# Patient Record
Sex: FEMALE | Race: WHITE | Employment: FULL TIME | URBAN - METROPOLITAN AREA
[De-identification: names, ages, dates, MRNs, and addresses within clinical notes are randomized per-mention and may not be internally consistent; named-entity substitution may affect disease eponyms.]

---

## 2017-03-13 ENCOUNTER — OFFICE VISIT (OUTPATIENT)
Dept: INTERNAL MEDICINE CLINIC | Facility: CLINIC | Age: 57
End: 2017-03-13

## 2017-03-13 ENCOUNTER — TELEPHONE (OUTPATIENT)
Dept: INTERNAL MEDICINE CLINIC | Facility: CLINIC | Age: 57
End: 2017-03-13

## 2017-03-13 VITALS
SYSTOLIC BLOOD PRESSURE: 140 MMHG | HEART RATE: 93 BPM | WEIGHT: 228 LBS | TEMPERATURE: 98 F | HEIGHT: 66 IN | BODY MASS INDEX: 36.64 KG/M2 | DIASTOLIC BLOOD PRESSURE: 88 MMHG

## 2017-03-13 DIAGNOSIS — Z09 HOSPITAL DISCHARGE FOLLOW-UP: ICD-10-CM

## 2017-03-13 DIAGNOSIS — R05.9 COUGH: Primary | ICD-10-CM

## 2017-03-13 DIAGNOSIS — Z12.11 COLON CANCER SCREENING: ICD-10-CM

## 2017-03-13 DIAGNOSIS — Z09 FOLLOW-UP EXAM, 3-6 MONTHS SINCE PREVIOUS EXAM: ICD-10-CM

## 2017-03-13 LAB
FLUAV + FLUBV RNA SPEC NAA+PROBE: NEGATIVE
FLUAV + FLUBV RNA SPEC NAA+PROBE: NEGATIVE
FLUAV + FLUBV RNA SPEC NAA+PROBE: POSITIVE

## 2017-03-13 PROCEDURE — 99212 OFFICE O/P EST SF 10 MIN: CPT | Performed by: INTERNAL MEDICINE

## 2017-03-13 PROCEDURE — 99213 OFFICE O/P EST LOW 20 MIN: CPT | Performed by: INTERNAL MEDICINE

## 2017-03-13 RX ORDER — AMOXICILLIN AND CLAVULANATE POTASSIUM 500; 125 MG/1; MG/1
1 TABLET, FILM COATED ORAL
Qty: 20 TABLET | Refills: 0 | Status: SHIPPED | OUTPATIENT
Start: 2017-03-13 | End: 2018-01-05 | Stop reason: ALTCHOICE

## 2017-03-13 NOTE — PROGRESS NOTES
HPI:    Patient ID: Kurtis Roa is a 49year old female. Cough  This is a new problem. The current episode started in the past 7 days. The problem has been gradually worsening. The problem occurs constantly.  The cough is productive of brown sputum and breath sounds normal. No respiratory distress. She has no wheezes. She has no rales. Abdominal: She exhibits no distension. There is no tenderness. Lymphadenopathy:     She has no cervical adenopathy. Skin: She is diaphoretic.    Nursing note and

## 2017-03-13 NOTE — TELEPHONE ENCOUNTER
Pt stts she  Was seen today and forget to order Rx Flonase and Rx Ventolin HFA. Please advise         Current outpatient prescriptions:     •  Fluticasone Propionate (FLONASE) 50 MCG/ACT Nasal Suspension, 1 spray by Each Nare route daily. , Disp: 1 Bottle,

## 2017-03-14 ENCOUNTER — TELEPHONE (OUTPATIENT)
Dept: INTERNAL MEDICINE CLINIC | Facility: CLINIC | Age: 57
End: 2017-03-14

## 2017-03-14 RX ORDER — OSELTAMIVIR PHOSPHATE 75 MG/1
75 CAPSULE ORAL 2 TIMES DAILY
Qty: 10 CAPSULE | Refills: 0 | Status: SHIPPED | OUTPATIENT
Start: 2017-03-14 | End: 2017-07-12

## 2017-03-14 NOTE — TELEPHONE ENCOUNTER
Positive lab result  Influenza A by PCR Negative  Negative     Influenza B by PCR Negative  Positive (A)

## 2017-03-18 RX ORDER — FLUTICASONE PROPIONATE 50 MCG
1 SPRAY, SUSPENSION (ML) NASAL DAILY
Qty: 1 BOTTLE | Refills: 7 | Status: SHIPPED | OUTPATIENT
Start: 2017-03-18 | End: 2017-05-22

## 2017-03-18 RX ORDER — ALBUTEROL SULFATE 90 UG/1
2 AEROSOL, METERED RESPIRATORY (INHALATION) EVERY 4 HOURS PRN
Qty: 1 INHALER | Refills: 5 | Status: SHIPPED | OUTPATIENT
Start: 2017-03-18

## 2017-03-20 RX ORDER — FLUTICASONE PROPIONATE 50 MCG
SPRAY, SUSPENSION (ML) NASAL
Qty: 1 BOTTLE | Refills: 11 | Status: SHIPPED | OUTPATIENT
Start: 2017-03-20 | End: 2017-07-12

## 2017-03-27 ENCOUNTER — TELEPHONE (OUTPATIENT)
Dept: INTERNAL MEDICINE CLINIC | Facility: CLINIC | Age: 57
End: 2017-03-27

## 2017-03-27 NOTE — TELEPHONE ENCOUNTER
Per pt, she would like her Order dated 03/13/2017 to send thru mail address on file verified due to pt is going to a different lab,  Per pt, stts that her Order should put US as needed. Pls advise.

## 2017-05-24 RX ORDER — FLUTICASONE PROPIONATE 50 MCG
SPRAY, SUSPENSION (ML) NASAL
Qty: 1 BOTTLE | Refills: 11 | Status: SHIPPED | OUTPATIENT
Start: 2017-05-24 | End: 2020-11-11

## 2017-06-14 ENCOUNTER — TELEPHONE (OUTPATIENT)
Dept: INTERNAL MEDICINE CLINIC | Facility: CLINIC | Age: 57
End: 2017-06-14

## 2017-07-12 ENCOUNTER — OFFICE VISIT (OUTPATIENT)
Dept: GASTROENTEROLOGY | Facility: CLINIC | Age: 57
End: 2017-07-12

## 2017-07-12 VITALS
DIASTOLIC BLOOD PRESSURE: 82 MMHG | HEIGHT: 66 IN | BODY MASS INDEX: 39.02 KG/M2 | WEIGHT: 242.81 LBS | SYSTOLIC BLOOD PRESSURE: 138 MMHG | HEART RATE: 84 BPM

## 2017-07-12 DIAGNOSIS — Z12.11 ENCOUNTER FOR SCREENING COLONOSCOPY: Primary | ICD-10-CM

## 2017-07-12 PROCEDURE — 99212 OFFICE O/P EST SF 10 MIN: CPT | Performed by: INTERNAL MEDICINE

## 2017-07-12 PROCEDURE — 99241 OFFICE CONSULTATION,LEVEL I: CPT | Performed by: INTERNAL MEDICINE

## 2017-07-12 NOTE — PATIENT INSTRUCTIONS
Schedule colonoscopy exam at [facility] ([facility] [address]    IV sedation (conscious sedation)    Suprep small volume bowel prep if covered by insurance, otherwise Golytely (PEG) 4L bowel prep      DX = Screening CLN

## 2017-07-12 NOTE — PROGRESS NOTES
HPI:    Patient ID: Irene Renee is a 49year old woman with history of allergies who presents to discuss her first screening colonoscopy examination. On review of systems, MsLinda  Kimmy Giraldo describes a distant history of a severe acute diarrhea syndrom with possible biopsy, possible polypectomy. We discussed sedation options of conscious sedation versus MAC anesthesia, and agreed on conscious sedation.  We discussed the nature and risks of colonoscopy examination including sedation, anesthesia risks; blee

## 2017-07-14 ENCOUNTER — TELEPHONE (OUTPATIENT)
Dept: GASTROENTEROLOGY | Facility: CLINIC | Age: 57
End: 2017-07-14

## 2017-07-14 NOTE — TELEPHONE ENCOUNTER
Scheduled for:  Colonoscopy [address]  Provider Name:  Macey Zimmer  Date:  9/18/17  Location:  [facility]  Sedation:  Iv Sedation  Time:  From-1315 pm changed to 1415 /arrival from- 1215 pm changed to 1315 pm  Prep:Suprep or Golytely  Meds/Allergies Reconciled?:  Physi

## 2017-07-21 NOTE — TELEPHONE ENCOUNTER
Called pt to let her know that her procedure time was changed to 2:15 pm and to arrive at 1315 at [facility] on Sept .18. See below. Sydnee Zavala was Aware of these changes

## 2017-09-18 ENCOUNTER — HOSPITAL ENCOUNTER (OUTPATIENT)
Facility: HOSPITAL | Age: 57
Setting detail: HOSPITAL OUTPATIENT SURGERY
Discharge: HOME | End: 2017-09-18
Attending: INTERNAL MEDICINE | Admitting: INTERNAL MEDICINE
Payer: COMMERCIAL

## 2017-09-18 ENCOUNTER — SURGERY (OUTPATIENT)
Age: 57
End: 2017-09-18

## 2017-09-18 VITALS
HEART RATE: 63 BPM | WEIGHT: 230 LBS | DIASTOLIC BLOOD PRESSURE: 87 MMHG | HEIGHT: 66 IN | SYSTOLIC BLOOD PRESSURE: 134 MMHG | BODY MASS INDEX: 36.96 KG/M2 | OXYGEN SATURATION: 96 % | RESPIRATION RATE: 15 BRPM

## 2017-09-18 PROCEDURE — 45378 DIAGNOSTIC COLONOSCOPY: CPT | Performed by: INTERNAL MEDICINE

## 2017-09-18 PROCEDURE — 0DJD8ZZ INSPECTION OF LOWER INTESTINAL TRACT, VIA NATURAL OR ARTIFICIAL OPENING ENDOSCOPIC: ICD-10-PCS | Performed by: INTERNAL MEDICINE

## 2017-09-18 PROCEDURE — G0500 MOD SEDAT ENDO SERVICE >5YRS: HCPCS | Performed by: INTERNAL MEDICINE

## 2017-09-18 PROCEDURE — 99153 MOD SED SAME PHYS/QHP EA: CPT | Performed by: INTERNAL MEDICINE

## 2017-09-18 RX ORDER — SODIUM CHLORIDE 0.9 % (FLUSH) 0.9 %
10 SYRINGE (ML) INJECTION AS NEEDED
Status: DISCONTINUED | OUTPATIENT
Start: 2017-09-18 | End: 2017-09-18

## 2017-09-18 RX ORDER — MIDAZOLAM HYDROCHLORIDE 1 MG/ML
1 INJECTION INTRAMUSCULAR; INTRAVENOUS EVERY 5 MIN PRN
Status: DISCONTINUED | OUTPATIENT
Start: 2017-09-18 | End: 2017-09-18

## 2017-09-18 RX ORDER — SODIUM CHLORIDE, SODIUM LACTATE, POTASSIUM CHLORIDE, CALCIUM CHLORIDE 600; 310; 30; 20 MG/100ML; MG/100ML; MG/100ML; MG/100ML
INJECTION, SOLUTION INTRAVENOUS CONTINUOUS
Status: DISCONTINUED | OUTPATIENT
Start: 2017-09-18 | End: 2017-09-18

## 2017-09-18 RX ORDER — MIDAZOLAM HYDROCHLORIDE 1 MG/ML
INJECTION INTRAMUSCULAR; INTRAVENOUS
Status: DISCONTINUED | OUTPATIENT
Start: 2017-09-18 | End: 2017-09-18

## 2017-09-18 NOTE — H&P
History & Physical Examination    Patient Name: Obdulia Worrell  MRN: [id number]  CSN: [id number]  YOB: 1960    Diagnosis: Screening colonoscopy examination    Present Illness:     Obdulia Worrell is a 49year old woman with history of al unspecified    • Seasonal allergies      Past Surgical History:  No date: ORAL SURGERY  2012: OTHER SURGICAL HISTORY Bilateral      Comment: lasik  Family History   Problem Relation Age of Onset   • Cancer Mother 76     Lung   • Heart Disorder Maternal Gra

## 2017-09-18 NOTE — OPERATIVE REPORT
COLONOSCOPY PROCEDURE REPORT     DATE OF PROCEDURE:  9/18/2017     PCP: No primary care provider on file. PREOPERATIVE DIAGNOSIS:  Screening colonoscopy examination     POSTOPERATIVE DIAGNOSIS:  See impression. SURGEON:  Scooter Crump M.D.

## 2018-01-05 ENCOUNTER — OFFICE VISIT (OUTPATIENT)
Dept: SURGERY | Facility: CLINIC | Age: 58
End: 2018-01-05

## 2018-01-05 VITALS — HEIGHT: 66 IN | WEIGHT: 239.81 LBS | BODY MASS INDEX: 38.54 KG/M2

## 2018-01-05 DIAGNOSIS — N62 MACROMASTIA: Primary | ICD-10-CM

## 2018-01-05 PROCEDURE — 99203 OFFICE O/P NEW LOW 30 MIN: CPT | Performed by: SURGERY

## 2018-01-05 NOTE — CONSULTS
New Patient Consultation    Chief Complaint: macromastia, shoulder grooving, upper back pain, intertrigo    History of Present Illness:   Naomi Wong is a 56year old female who reports having had large breast ever since high school.   She initially w reports see HPI  General:   The patient denies, fever, chills, night sweats, fatigue, generalized weakness, change in appetite, weight loss, or weight gain. Endocrine:    There is no history of poor/slow wound healing, weight loss/gain, fertility or hormon stiff joints, neck pain, back pain or bone pain.   Neurologic:  There is no history of migraines or severe headaches, seizure/epilepsy, speech problems, coordination problems, trembling/tremors, fainting/black outs, dizziness, memory problems, loss of sensa her back for preoperative visit. In case of insurance denial will also provide her with a quote for the procedure. Her mammograms are done yearly up-to-date and normal.  She has no family history of breast cancer.     The procedures and the postoperative

## 2018-04-06 ENCOUNTER — TELEPHONE (OUTPATIENT)
Dept: SURGERY | Facility: CLINIC | Age: 58
End: 2018-04-06

## 2018-04-06 NOTE — TELEPHONE ENCOUNTER
Spoke to regarding her Pre-D request for surgery. Informed patient of the denial.  Patient is considering her next steps and call back at a later time.

## 2023-03-22 ENCOUNTER — OFFICE VISIT (OUTPATIENT)
Dept: OTHER | Facility: HOSPITAL | Age: 63
End: 2023-03-22
Attending: FAMILY MEDICINE

## 2023-03-22 ENCOUNTER — HOSPITAL ENCOUNTER (OUTPATIENT)
Dept: GENERAL RADIOLOGY | Facility: HOSPITAL | Age: 63
Discharge: HOME | End: 2023-03-22
Attending: FAMILY MEDICINE

## 2023-03-22 DIAGNOSIS — M54.50 ACUTE BILATERAL LOW BACK PAIN: ICD-10-CM

## 2023-03-22 DIAGNOSIS — M54.50 ACUTE BILATERAL LOW BACK PAIN: Primary | ICD-10-CM

## 2023-03-22 PROCEDURE — 72110 X-RAY EXAM L-2 SPINE 4/>VWS: CPT | Performed by: FAMILY MEDICINE

## 2023-03-22 RX ORDER — NAPROXEN 500 MG/1
500 TABLET ORAL 2 TIMES DAILY WITH MEALS
Qty: 30 TABLET | Refills: 1 | Status: SHIPPED | OUTPATIENT
Start: 2023-03-22 | End: 2023-04-21

## 2023-03-22 RX ORDER — CYCLOBENZAPRINE HCL 10 MG
10 TABLET ORAL NIGHTLY PRN
Qty: 30 TABLET | Refills: 0 | Status: SHIPPED | OUTPATIENT
Start: 2023-03-22 | End: 2023-04-21

## 2023-03-28 ENCOUNTER — APPOINTMENT (OUTPATIENT)
Dept: OTHER | Facility: HOSPITAL | Age: 63
End: 2023-03-28
Attending: FAMILY MEDICINE

## 2023-05-19 ENCOUNTER — LAB ENCOUNTER (OUTPATIENT)
Dept: LAB | Facility: HOSPITAL | Age: 63
End: 2023-05-19
Attending: REGISTERED NURSE
Payer: COMMERCIAL

## 2023-05-19 ENCOUNTER — OFFICE VISIT (OUTPATIENT)
Facility: CLINIC | Age: 63
End: 2023-05-19

## 2023-05-19 VITALS
DIASTOLIC BLOOD PRESSURE: 95 MMHG | BODY MASS INDEX: 39.7 KG/M2 | HEIGHT: 66 IN | HEART RATE: 90 BPM | SYSTOLIC BLOOD PRESSURE: 155 MMHG | WEIGHT: 247 LBS

## 2023-05-19 DIAGNOSIS — K59.00 CONSTIPATION, UNSPECIFIED CONSTIPATION TYPE: Primary | ICD-10-CM

## 2023-05-19 DIAGNOSIS — R19.5 DARK STOOLS: ICD-10-CM

## 2023-05-19 LAB
BASOPHILS # BLD AUTO: 0.03 X10(3) UL (ref 0–0.2)
BASOPHILS NFR BLD AUTO: 0.6 %
DEPRECATED RDW RBC AUTO: 48.7 FL (ref 35.1–46.3)
EOSINOPHIL # BLD AUTO: 0.17 X10(3) UL (ref 0–0.7)
EOSINOPHIL NFR BLD AUTO: 3.2 %
ERYTHROCYTE [DISTWIDTH] IN BLOOD BY AUTOMATED COUNT: 14.3 % (ref 11–15)
HCT VFR BLD AUTO: 33.7 %
HGB BLD-MCNC: 10.3 G/DL
IMM GRANULOCYTES # BLD AUTO: 0.01 X10(3) UL (ref 0–1)
IMM GRANULOCYTES NFR BLD: 0.2 %
LYMPHOCYTES # BLD AUTO: 1.52 X10(3) UL (ref 1–4)
LYMPHOCYTES NFR BLD AUTO: 28.8 %
MCH RBC QN AUTO: 28.1 PG (ref 26–34)
MCHC RBC AUTO-ENTMCNC: 30.6 G/DL (ref 31–37)
MCV RBC AUTO: 91.8 FL
MONOCYTES # BLD AUTO: 0.42 X10(3) UL (ref 0.1–1)
MONOCYTES NFR BLD AUTO: 8 %
NEUTROPHILS # BLD AUTO: 3.12 X10 (3) UL (ref 1.5–7.7)
NEUTROPHILS # BLD AUTO: 3.12 X10(3) UL (ref 1.5–7.7)
NEUTROPHILS NFR BLD AUTO: 59.2 %
PLATELET # BLD AUTO: 320 10(3)UL (ref 150–450)
RBC # BLD AUTO: 3.67 X10(6)UL
WBC # BLD AUTO: 5.3 X10(3) UL (ref 4–11)

## 2023-05-19 PROCEDURE — 85025 COMPLETE CBC W/AUTO DIFF WBC: CPT | Performed by: REGISTERED NURSE

## 2023-05-19 PROCEDURE — 36415 COLL VENOUS BLD VENIPUNCTURE: CPT | Performed by: REGISTERED NURSE

## 2023-05-19 RX ORDER — PANTOPRAZOLE SODIUM 40 MG/1
40 TABLET, DELAYED RELEASE ORAL
Qty: 60 TABLET | Refills: 0 | Status: SHIPPED | OUTPATIENT
Start: 2023-05-19 | End: 2023-07-18

## 2023-05-19 NOTE — PATIENT INSTRUCTIONS
-Complete CBC  -Empiric PPI  -Start Miralax 17g per day, titrate up or down for ideal frequency  -Increase water intake to 64oz of water per day  -Increase fiber to 20-30 g per day with fruits, vegetables and whole grains, citrucel or fibercon fiber supplement  -Increase exercise to 150 mins/week  -Do not ignore urge to defecate  -Avoid artifical sugars

## 2023-05-22 ENCOUNTER — TELEPHONE (OUTPATIENT)
Facility: CLINIC | Age: 63
End: 2023-05-22

## 2023-05-22 DIAGNOSIS — R19.5 DARK STOOLS: ICD-10-CM

## 2023-05-22 DIAGNOSIS — D50.8 OTHER IRON DEFICIENCY ANEMIA: Primary | ICD-10-CM

## 2023-05-22 DIAGNOSIS — K59.00 CONSTIPATION, UNSPECIFIED CONSTIPATION TYPE: ICD-10-CM

## 2023-05-22 NOTE — TELEPHONE ENCOUNTER
Schedulers:  Please contact patient to schedule EGD per below orders from Doris FUENTES.     Thank you

## 2023-05-22 NOTE — TELEPHONE ENCOUNTER
Pt called via listed phone number,  verified. Discussed CBC with noted normocytic anemia. Recommend completing iron studies to rule out iron deficiency anemia as well as TSH/calcium for constipation. Discussed role of bidirectional endoscopic evaluation given anemia, intermittent constipation and dark stools. Pt does not wish to complete colonoscopy at this time but is amenable to proceeding with EGD to further evaluate of possible esophagitis, gastritis, PUD, AVM, or neoplasm. Discussed risk of delayed/missed diagnosis if she does not undergo colonoscopy. She states understanding and still wishes to only undergo EGD at this time. Pt will complete lab work and schedule EGD. Will discuss adding colonoscopy following iron study results. Continue PPI and bowel regimen in the interim. EGD consent: I have discussed the risks, benefits, and alternatives to upper endoscopy/enteroscopy with the patient/primary decision maker [who demonstrated understanding], including but not limited to the risks of bleeding, infection, pain, death, as well as the risks of anesthesia and perforation all leading to prolonged hospitalization, surgical intervention, or even death. I also specifically mentioned the miss rate of upper endoscopy of 5-10% in the best of all circumstances. The patient has agreed to sign an informed consent and elected to proceed with procedure with possible intervention [i.e. polypectomy, stent placement, etc.] as indicated. GI RNs,    Please, schedule Ms. Quinton Santiago for EGD with MAC for anemia and dark stools.     Thanks,  Odalis Rey

## 2023-06-06 ENCOUNTER — TELEPHONE (OUTPATIENT)
Facility: CLINIC | Age: 63
End: 2023-06-06

## 2023-06-06 NOTE — TELEPHONE ENCOUNTER
Scheduled for:  EGD 490[address]  Provider Name:  Dr Wicho Kumari  Date:  7/19/2023  Location:  [facility]  Sedation:  MAC  Time:  10:45 am. (pt is aware to arrive at 9:45 am)  Prep:  NPO after midnight  Meds/Allergies Reconciled?:  Yes  Diagnosis with codes:  Anemia D50.8, dark stools R19.5  Was patient informed to call insurance with codes (Y/N):  Yes   Referral sent?:  Referral was sent at the time of electronic surgical scheduling. [facility] or [facility] [address] [place] [address] notified?:  I sent an electronic request to Endo Scheduling and received a confirmation today. Medication Orders:  Pt is aware to NOT take iron pills, herbal meds and diet supplements for 7 days before exam. Also to NOT take any form of alcohol, recreational drugs and any forms of ED meds 24 hours before exam.   Misc Orders:  N/A   Further instructions given by staff:  I discussed the prep instructions with the patient which she verbally understood. I will send prep instructions to My Chart.

## 2023-06-06 NOTE — TELEPHONE ENCOUNTER
Patient contacted her insurance and was informed that our office needs to call for prior authorization, phone [phone],GUNJANLZLETITIA.   *Please update patient at [phone]

## 2023-07-19 ENCOUNTER — HOSPITAL ENCOUNTER (OUTPATIENT)
Facility: HOSPITAL | Age: 63
Setting detail: HOSPITAL OUTPATIENT SURGERY
Discharge: HOME | End: 2023-07-19
Attending: INTERNAL MEDICINE | Admitting: INTERNAL MEDICINE
Payer: COMMERCIAL

## 2023-07-19 ENCOUNTER — TELEPHONE (OUTPATIENT)
Dept: GASTROENTEROLOGY | Facility: CLINIC | Age: 63
End: 2023-07-19

## 2023-07-19 VITALS
TEMPERATURE: 98 F | HEIGHT: 66 IN | WEIGHT: 240 LBS | HEART RATE: 73 BPM | SYSTOLIC BLOOD PRESSURE: 126 MMHG | OXYGEN SATURATION: 92 % | DIASTOLIC BLOOD PRESSURE: 75 MMHG | BODY MASS INDEX: 38.57 KG/M2 | RESPIRATION RATE: 17 BRPM

## 2023-07-19 DIAGNOSIS — R19.5 DARK STOOLS: ICD-10-CM

## 2023-07-19 DIAGNOSIS — D50.8 OTHER IRON DEFICIENCY ANEMIA: ICD-10-CM

## 2023-07-19 PROBLEM — D64.9 ABSOLUTE ANEMIA: Status: ACTIVE | Noted: 2023-07-19

## 2023-07-19 PROBLEM — K31.9 GASTRIC ERYTHEMA: Status: ACTIVE | Noted: 2023-07-19

## 2023-07-19 PROCEDURE — 0DB98ZX EXCISION OF DUODENUM, VIA NATURAL OR ARTIFICIAL OPENING ENDOSCOPIC, DIAGNOSTIC: ICD-10-PCS | Performed by: INTERNAL MEDICINE

## 2023-07-19 PROCEDURE — G0500 MOD SEDAT ENDO SERVICE >5YRS: HCPCS | Performed by: INTERNAL MEDICINE

## 2023-07-19 PROCEDURE — 0DB78ZX EXCISION OF STOMACH, PYLORUS, VIA NATURAL OR ARTIFICIAL OPENING ENDOSCOPIC, DIAGNOSTIC: ICD-10-PCS | Performed by: INTERNAL MEDICINE

## 2023-07-19 PROCEDURE — 43239 EGD BIOPSY SINGLE/MULTIPLE: CPT | Performed by: INTERNAL MEDICINE

## 2023-07-19 PROCEDURE — 0DB68ZX EXCISION OF STOMACH, VIA NATURAL OR ARTIFICIAL OPENING ENDOSCOPIC, DIAGNOSTIC: ICD-10-PCS | Performed by: INTERNAL MEDICINE

## 2023-07-19 RX ORDER — MIDAZOLAM HYDROCHLORIDE 1 MG/ML
INJECTION INTRAMUSCULAR; INTRAVENOUS
Status: DISCONTINUED | OUTPATIENT
Start: 2023-07-19 | End: 2023-07-19

## 2023-07-19 RX ORDER — PANTOPRAZOLE SODIUM 40 MG/1
40 TABLET, DELAYED RELEASE ORAL
Qty: 90 TABLET | Refills: 0 | Status: SHIPPED | OUTPATIENT
Start: 2023-07-19 | End: 2023-10-17

## 2023-07-19 RX ORDER — SODIUM CHLORIDE, SODIUM LACTATE, POTASSIUM CHLORIDE, CALCIUM CHLORIDE 600; 310; 30; 20 MG/100ML; MG/100ML; MG/100ML; MG/100ML
INJECTION, SOLUTION INTRAVENOUS CONTINUOUS
Status: DISCONTINUED | OUTPATIENT
Start: 2023-07-19 | End: 2023-07-19

## 2023-07-19 RX ORDER — SODIUM CHLORIDE 0.9 % (FLUSH) 0.9 %
10 SYRINGE (ML) INJECTION AS NEEDED
Status: DISCONTINUED | OUTPATIENT
Start: 2023-07-19 | End: 2023-07-19

## 2023-07-19 NOTE — H&P
History & Physical Examination    Patient Name: Anushka Connors  MRN: [id number]  [facility]: [id number]  YOB: 1960    Diagnosis: ab pain, anemia, dark stools    [] pantoprazole 40 MG Oral Tab EC, Take 1 tablet (40 mg total) by mouth every morning before breakfast., Disp: 60 tablet, Rfl: 0  [] naproxen 500 MG Oral Tab, Take 1 tablet (500 mg total) by mouth 2 (two) times daily with meals. , Disp: 30 tablet, Rfl: 1  [] cyclobenzaprine 10 MG Oral Tab, Take 1 tablet (10 mg total) by mouth nightly as needed for Muscle spasms. , Disp: 30 tablet, Rfl: 0  FLUTICASONE PROPIONATE 50 MCG/ACT Nasal Suspension, SHAKE LIQUID AND USE 2 SPRAYS IN EACH NOSTRIL DAILY, Disp: 48 g, Rfl: 0, 2023  Albuterol Sulfate HFA (VENTOLIN HFA) 108 (90 Base) MCG/ACT Inhalation Aero Soln, Inhale 2 puffs into the lungs every 4 (four) hours as needed for Wheezing., Disp: 1 Inhaler, Rfl: 5  Mometasone Furoate (ASMANEX HFA IN), Inhale into the lungs. , Disp: , Rfl:   Mometasone Furoate (ASMANEX, 120 METERED DOSES,) 220 MCG/INH Inhalation Aerosol Powder, Breath Activated, Inhale 2 puffs into the lungs daily. , Disp: 3 Inhaler, Rfl: 0  Levonorgestrel (MIRENA, 52 MG,) 20 MCG/24HR Intrauterine IUD, 20 mcg (1 each total) by Intrauterine route once., Disp: , Rfl:       sodium chloride 0.9% 0.9% flush injection 10 mL, 10 mL, Intravenous, PRN  lactated ringers infusion, , Intravenous, Continuous        Allergies: No Known Allergies    Past Medical History:   Diagnosis Date    Asthma Age 10    Decorative tattoo 1st one age 29    Extrinsic asthma, unspecified     Mirena Inserted 2019    Seasonal allergies      Past Surgical History:   Procedure Laterality Date    BENIGN BIOPSY RIGHT  2014    COLONOSCOPY N/A 2017    Procedure: COLONOSCOPY;  Surgeon: Ronen Schofield MD;  Location: [facility] ENDOSCOPY    ORAL SURGERY      OTHER Bilateral     Abdominal Liposuction    OTHER SURGICAL HISTORY Bilateral     lasik Family History   Problem Relation Age of Onset    Cancer Mother 76        Lung    Heart Disorder Maternal Grandmother         bypass sx    Cancer Paternal Grandfather         Colon CA    Colon Cancer Paternal Grandfather     Kidney Disease Father 78        Chronic Kidney Disease     Social History    Tobacco Use      Smoking status: Never      Smokeless tobacco: Never    Alcohol use: No      Alcohol/week: 0.0 standard drinks of alcohol      SYSTEM Check if Review is Normal Check if Physical Exam is Normal If not normal, please explain:   CONNIE Disha.Grover ] [ Gray Frank  Disha.Grover ] [ Costa Cheng Disha.Grover ] [ Karri Fernandez Disha.Rgover ] [ Dillon Kern Disha.Grover ] [ Jovany Vazquez Disha.Grover ] [ Liliana Scott        I have discussed the risks and benefits and alternatives of the procedure with the patient/family. They understand and agree to proceed with plan of care. I have reviewed the History and Physical done within the last 30 days. Any changes noted above.     Antonio Stockton, [address] [place] [address] - Gastroenterology  7/19/2023  9:52 AM

## 2023-07-19 NOTE — OPERATIVE REPORT
ESOPHAGOGASTRODUODENOSCOPY (EGD) REPORT    Guido Santamaria     1960 Age 63year old   PCP Curtis Dow MD Endoscopist Trisha Jarquin MD     Date of procedure: 23    Procedure: EGD w/cold biopsy    Pre-operative diagnosis: Dark stools, anemia, ab pain    Post-operative diagnosis: Gastric erythema    Medications: Versed 8 mg IV push. Fentanyl 100 mcg IV push. [Per my order and under my supervision, the patient was sedated with intermittent intravenous doses of versed and fentanyl. The vital signs were monitored and recorded by an experienced RN. The procedure started after the patient was adequately sedated. Total time for moderate conscious sedation was 14 minutes]. Cetacaine spray x1 to back of throat. Complications: none    Procedure:  Informed consent was obtained from the patient after the risks of the procedure were discussed, including but not limited to bleeding, perforation, aspiration, infection, or possibility of a missed lesion. After discussions of the risks/benefits and alternatives to this procedure, as well as the planned sedation, the patient was placed in the left lateral decubitus position and begun on continuous blood pressure pulse oximetry and EKG monitoring and this was maintained throughout the procedure. Once an adequate level of sedation was obtained a bite block was placed. Then the lubricated tip of the [id number] diagnostic video upper endoscope was inserted and advanced using direct visualization into the posterior pharynx and ultimately into the esophagus. Complications: None    Findings:      1. Esophagus: The squamocolumnar junction was noted at 35 cm and appeared regular. The diaphragmatic pinch was noted noted at 35 cm from the incisors. The esophageal mucosa appeared normal. There was no endoscopic findings of esophagitis, stricture or Sidhu's esophagus. 2. Stomach: The stomach distended normally. Normal rugal folds were seen.  The pylorus was patent. The gastric mucosa appeared mildly erythematous s/p random gastric biopsies. Additionally in the antrum, a ovoid shaped erosion was noted s/p biopsies. Could be a healing ulcer. Retroflexion revealed a normal fundus and a non-patulous cardia. 3. Duodenum: The duodenal mucosa appeared normal in the 1st and 2nd portion of the duodenum. S/p random biopsies. Impression:  1. No mass or ulcer currently, however in the antrum, possibly healing ulcer. Non-bleeding. Suspect NSAID induced, r/out Hpylori. 2. Normal esophagus/duodenum. Recommend:  1. Await pathology. 2. Avoid NSAIDS. 3. Continue pantoprazole daily for another 3 months. 4. Trend Hgb/iron studies.    >>>If tissue was sampled/removed and you have not received your pathology results either by phone or letter within 2 weeks, please call our office at [phone].     Specimens:  Gastric, duodenal    Blood loss: <1 ml

## 2023-07-19 NOTE — DISCHARGE INSTRUCTIONS
Home Care Instructions for Gastroscopy with Sedation    Diet:  - Resume your regular diet as tolerated unless otherwise instructed. - Start with light meals to minimize bloating.  - Do not drink alcohol today. Medication:  - If you have questions about resuming your normal medications, please contact your Primary Care Physician. Activities:  - Take it easy today. Do not return to work today. - Do not drive today. - Do not operate any machinery today (including kitchen equipment). Gastroscopy:  - You may have a sore throat for 2-3 days following the exam. This is normal. Gargling with warm salt water (1/2 tsp salt to 1 glass warm water) or using throat lozenges will help. - If you experience any sharp pain in your neck, abdomen or chest, vomiting of blood, oral temperature over 100 degrees Fahrenheit, light-headedness or dizziness, or any other problems, contact your doctor. **If unable to reach your doctor, please go to the [facility] Emergency Room**    - Your referring physician will receive a full report of your examination.  - If you do not hear from your doctor's office within two weeks of your biopsy, please call them for your results. You may be able to see your laboratory results in [facility] between 4 and 7 business days. In some cases, your physician may not have viewed the results before they are released to [address] If you have questions regarding your results contact the physician who ordered the test/exam by phone or via [address] by choosing \"Ask a Medical Question. \"

## 2023-08-08 ENCOUNTER — TELEPHONE (OUTPATIENT)
Facility: CLINIC | Age: 63
End: 2023-08-08

## 2023-08-08 NOTE — TELEPHONE ENCOUNTER
I mailed out EGD results letter to pt  Updated health maintenance    EGD done 7/19/2023-No EGD recall entered

## 2023-10-13 NOTE — TELEPHONE ENCOUNTER
Requested Prescriptions     Pending Prescriptions Disp Refills    PANTOPRAZOLE 40 MG Oral Tab EC [Pharmacy Med Name: PANTOPRAZOLE 40MG TABLETS] 90 tablet 0     Sig: TAKE 1 TABLET(40 MG) BY MOUTH EVERY MORNING BEFORE BREAKFAST       LOV 5/19/2023 w/ Miko Mae  LR  7/19/2023  EGD done 7/19/2023 w/Dr Aislinn Ram

## 2023-10-14 RX ORDER — PANTOPRAZOLE SODIUM 40 MG/1
40 TABLET, DELAYED RELEASE ORAL
Qty: 90 TABLET | Refills: 0 | Status: SHIPPED | OUTPATIENT
Start: 2023-10-14

## 2023-11-01 ENCOUNTER — TELEPHONE (OUTPATIENT)
Facility: CLINIC | Age: 63
End: 2023-11-01

## (undated) DEVICE — YANKAUER SUCTION INSTRUMENT NO CONTROL VENT, BULB TIP, CLEAR: Brand: YANKAUER

## (undated) DEVICE — FORCEP RADIAL JAW 4

## (undated) DEVICE — 60 ML SYRINGE REGULAR TIP: Brand: MONOJECT

## (undated) DEVICE — Device: Brand: DEFENDO AIR/WATER/SUCTION AND BIOPSY VALVE

## (undated) DEVICE — MEDI-VAC NON-CONDUCTIVE SUCTION TUBING 6MM X 1.8M (6FT.) L: Brand: CARDINAL HEALTH

## (undated) DEVICE — CONMED SCOPE SAVER BITE BLOCK, 20X27 MM: Brand: SCOPE SAVER

## (undated) DEVICE — KIT ENDO ORCAPOD 160/180/190

## (undated) DEVICE — Device: Brand: DUAL NARE NASAL CANNULAE FEMALE LUER CON 7FT O2 TUBE

## (undated) DEVICE — KIT CLEAN ENDOKIT 1.1OZ GOWNX2

## (undated) DEVICE — ENDOSCOPY PACK - LOWER: Brand: MEDLINE INDUSTRIES, INC.

## (undated) DEVICE — MEDI-VAC NON-CONDUCTIVE SUCTION TUBING: Brand: CARDINAL HEALTH

## (undated) NOTE — LETTER
11/1/2023              [address] [place] [address]         Dear Rola Ayala records indicate that the tests ordered for you by GUILLERMINA Tolbert  have not been done. Iron And Tibc (Order #[id number]) on 5/22/23     Ferritin (Order #[id number]) on 5/22/23     TSH W Reflex To Free T4 (Order #[id number]) on 5/22/23     Basic Metabolic Panel (8) (Order #[id number]) on 5/22/23       If you have, in fact, already completed the tests or you do not wish to have the tests done, please contact our office at [address] [place] [address]. Otherwise, please proceed with the testing. Enclosed is a duplicate order for your convenience.         Sincerely,    GUILLERMINA Tolbert  [facility], [address] [address]  [phone]

## (undated) NOTE — LETTER
[address] [place] [address] Rd, [place]       INFORMED REFUSAL FOR TREATMENT / PROCEDURE / TESTING      I have been advised by Dr. Nate Murcia that it is necessary for me to undergo the following treatment, procedure or testing. The treatment, procedure or test is as follows:    ___________________Pregnancy test ______________    The need for this treatment, procedures and/or testing, its benefits and risks, and alternatives has been explained to me by my physician. I understand that my refusal to consent to the recommended medical treatment or testing may seriously impair my health and even jeopardize my life. While I understand the possible consequences, I nevertheless refuse to submit to the recommended treatment, procedure or testing against medical advice.   I assume responsibility for the consequences of this refusal and release [facility], [address], it affiliates and subsidiaries, its employees and agents, and the physician, from any and all liability associated with my refusal to follow the medical advice and permit treatment, procedure or testing.          ________________________________________  (Patient Signature)      Dejan Hays  (Patient Name, Printed)    ________________________________________  (Date)                  Patient Name: Dejan Hays     : 1960                 Printed: 2023      Medical Record #: [id number]                                              Page 1

## (undated) NOTE — MR AVS SNAPSHOT
[facility] [place] [address] [address] [id number] [phone]               Thank you for choosing us for your health care visit with Lefty Woods MD.  We are glad to serve you and happy to provide you with this summary o Referral Details     Referred By    Referred To    Brendon Goodell, MD   [address] [facility]   [address] [address] [facility] [address]   Phone:  [phone]   Fax:  [phone]    Diagnoses:  Colon cancer screening   Order:  Evaluate & Treat, Gastro (Internal)    Paulina Pena Albuterol Sulfate  (90 Base) MCG/ACT Aers   Inhale 2 puffs into the lungs every 4 (four) hours as needed for Wheezing. Commonly known as:  VENTOLIN HFA           Fluticasone Propionate 50 MCG/ACT Susp   1 spray by Each Nare route daily.    Commonl Moderation of alcohol consumption Men: limit to <= 2 drinks* per day. Women and lighter weight persons: limit to <= 1 drink* per day.                       Healthy Diet and Regular Exercise  [facility] [employer] for making healthy food cho

## (undated) NOTE — LETTER
[address]  Authorization for Surgical Operation and Procedure                                                                                           I hereby authorize Sylvia Price MD, my physician and his/her assistants (if applicable), which may include medical students, residents, and/or fellows, to perform the following surgical operation/ procedure and administer such anesthesia as may be determined necessary by my physician: Operation/Procedure name (s) ESOPHAGOGASTRODUODENOSCOPY (EGD) on [address]   2. I recognize that during the surgical operation/procedure, unforeseen conditions may necessitate additional or different procedures than those listed above. I, therefore, further authorize and request that the above-named surgeon, assistants, or designees perform such procedures as are, in their judgment, necessary and desirable. 3.   My surgeon/physician has discussed prior to my surgery the potential benefits, risks and side effects of this procedure; the likelihood of achieving goals; and potential problems that might occur during recuperation. They also discussed reasonable alternatives to the procedure, including risks, benefits, and side effects related to the alternatives and risks related to not receiving this procedure. I have had all my questions answered and I acknowledge that no guarantee has been made as to the result that may be obtained. 4.   Should the need arise during my operation/procedure, which includes change of level of care prior to discharge, I also consent to the administration of blood and/or blood products. Further, I understand that despite careful testing and screening of blood or blood products by collecting agencies, I may still be subject to ill effects as a result of receiving a blood transfusion and/or blood products.   The following are some, but not all, of the potential risks that can occur: fever and allergic reactions, hemolytic reactions, transmission of diseases such as Hepatitis, AIDS and Cytomegalovirus (CMV) and fluid overload. In the event that I wish to have an autologous transfusion of my own blood, or a directed donor transfusion, I will discuss this with my physician. Check only if Refusing Blood or Blood Products  I understand refusal of blood or blood products as deemed necessary by my physician may have serious consequences to my condition to include possible death. I hereby assume responsibility for my refusal and release the hospital, its personnel, and my physicians from any responsibility for the consequences of my refusal.    o  Refuse   5. I authorize the use of any specimen, organs, tissues, body parts or foreign objects that may be removed from my body during the operation/procedure for diagnosis, research or teaching purposes and their subsequent disposal by hospital authorities. I also authorize the release of specimen test results and/or written reports to my treating physician on the hospital medical staff or other referring or consulting physicians involved in my care, at the discretion of the Pathologist or my treating physician. 6.   I consent to the photographing or videotaping of the operations or procedures to be performed, including appropriate portions of my body for medical, scientific, or educational purposes, provided my identity is not revealed by the pictures or by descriptive texts accompanying them. If the procedure has been photographed/videotaped, the surgeon will obtain the original picture, image, videotape or CD. The hospital will not be responsible for storage, release or maintenance of the picture, image, tape or CD.    7.   I consent to the presence of a  or observers in the operating room as deemed necessary by my physician or their designees.     8.   I recognize that in the event my procedure results in extended X-Ray/fluoroscopy time, I may develop a skin reaction. 9. If I have a Do Not Attempt Resuscitation (DNAR) order in place, that status will be suspended while in the operating room, procedural suite, and during the recovery period unless otherwise explicitly stated by me (or a person authorized to consent on my behalf). The surgeon or my attending physician will determine when the applicable recovery period ends for purposes of reinstating the DNAR order. 10. Patients having a sterilization procedure: I understand that if the procedure is successful the results will be permanent and it will therefore be impossible for me to inseminate, conceive, or bear children. I also understand that the procedure is intended to result in sterility, although the result has not been guaranteed. 11. I acknowledge that my physician has explained sedation/analgesia administration to me including the risk and benefits I consent to the administration of sedation/analgesia as may be necessary or desirable in the judgment of my physician. I CERTIFY THAT I HAVE READ AND FULLY UNDERSTAND THE ABOVE CONSENT TO OPERATION and/or OTHER PROCEDURE.     _________________________________________ _________________________________     ___________________________________  Signature of Patient     Signature of Responsible Person                   Printed Name of Responsible Person                              _________________________________________ ______________________________        ___________________________________  Signature of Witness         Date  Time         Relationship to Patient    STATEMENT OF PHYSICIAN My signature below affirms that prior to the time of the procedure; I have explained to the patient and/or his/her legal representative, the risks and benefits involved in the proposed treatment and any reasonable alternative to the proposed treatment.  I have also explained the risks and benefits involved in refusal of the proposed treatment and alternatives to the proposed treatment and have answered the patient's questions.  If I have a significant financial interest in a co-management agreement or a significant financial interest in any product or implant, or other significant relationship used in this procedure/surgery, I have disclosed this and had a discussion with my patient.     _______________________________________________________________ _____________________________  Rudolph Faulkner Physician)                                                                                         (Date)                                   (Time)  Patient Name: Macy Brower    : 1960   Printed: 2023      Medical Record #: [id number]                                              Page 1 of 1